# Patient Record
Sex: MALE | Race: BLACK OR AFRICAN AMERICAN | NOT HISPANIC OR LATINO | Employment: FULL TIME | RURAL
[De-identification: names, ages, dates, MRNs, and addresses within clinical notes are randomized per-mention and may not be internally consistent; named-entity substitution may affect disease eponyms.]

---
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
## 2025-05-07 ENCOUNTER — HOSPITAL ENCOUNTER (EMERGENCY)
Facility: HOSPITAL | Age: 20
Discharge: HOME | End: 2025-05-07
Payer: COMMERCIAL

## 2025-05-07 VITALS
DIASTOLIC BLOOD PRESSURE: 73 MMHG | SYSTOLIC BLOOD PRESSURE: 146 MMHG | HEART RATE: 69 BPM | BODY MASS INDEX: 17.3 KG/M2 | RESPIRATION RATE: 18 BRPM | WEIGHT: 114.13 LBS | TEMPERATURE: 99 F | HEIGHT: 68 IN | OXYGEN SATURATION: 100 %
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
## 2025-05-07 DIAGNOSIS — M62.838 MUSCLE SPASMS OF NECK: ICD-10-CM

## 2025-05-07 DIAGNOSIS — S46.211A STRAIN OF RIGHT BICEPS MUSCLE, INITIAL ENCOUNTER: ICD-10-CM

## 2025-05-07 PROCEDURE — 63600175 PHARM REV CODE 636 W HCPCS: Mod: JZ,TB | Performed by: NURSE PRACTITIONER

## 2025-05-07 PROCEDURE — 99284 EMERGENCY DEPT VISIT MOD MDM: CPT | Mod: ,,, | Performed by: NURSE PRACTITIONER

## 2025-05-07 PROCEDURE — 99284 EMERGENCY DEPT VISIT MOD MDM: CPT | Mod: 25

## 2025-05-07 PROCEDURE — 96372 THER/PROPH/DIAG INJ SC/IM: CPT | Performed by: NURSE PRACTITIONER

## 2025-05-07 RX ORDER — NAPROXEN 500 MG/1
500 TABLET ORAL 2 TIMES DAILY WITH MEALS
Qty: 20 TABLET | Refills: 0 | Status: SHIPPED | OUTPATIENT
Start: 2025-05-07

## 2025-05-07 RX ORDER — CYCLOBENZAPRINE HCL 5 MG
5 TABLET ORAL 2 TIMES DAILY PRN
Qty: 10 TABLET | Refills: 0 | Status: SHIPPED | OUTPATIENT
Start: 2025-05-07 | End: 2025-05-12

## 2025-05-07 RX ORDER — KETOROLAC TROMETHAMINE 30 MG/ML
30 INJECTION, SOLUTION INTRAMUSCULAR; INTRAVENOUS
Status: COMPLETED | OUTPATIENT
Start: 2025-05-07 | End: 2025-05-07

## 2025-05-07 RX ORDER — ORPHENADRINE CITRATE 30 MG/ML
60 INJECTION INTRAMUSCULAR; INTRAVENOUS
Status: COMPLETED | OUTPATIENT
Start: 2025-05-07 | End: 2025-05-07

## 2025-05-07 RX ADMIN — KETOROLAC TROMETHAMINE 30 MG: 30 INJECTION, SOLUTION INTRAMUSCULAR at 07:05

## 2025-05-07 RX ADMIN — ORPHENADRINE CITRATE 60 MG: 60 INJECTION INTRAMUSCULAR; INTRAVENOUS at 07:05

## 2025-05-07 NOTE — Clinical Note
"Davarious "Jean-Claude iLndquist was seen and treated in our emergency department on 5/7/2025.  He may return to work on 05/08/2025.  Patient treated in ER on 5/7/2025     If you have any questions or concerns, please don't hesitate to call.      S Bishop RN/ SAMINA Donis NP RN    "

## 2025-05-07 NOTE — Clinical Note
"Quangarious "Werner"Lexa was seen and treated in our emergency department on 5/7/2025.  He may return to work on 05/09/2025.       If you have any questions or concerns, please don't hesitate to call.       RN    "

## 2025-05-07 NOTE — LETTER
"  [facility] Emergency Department  [address] [facility]  [place] [address]  Phone: [phone]  Fax: [phone]   May 14, 2025    Patient: Davsadie "Werner" Lexa   YOB: 2005   Date of Visit: 5/7/2025   Patient ID [id number]       To Whom It May Concern:    Werner "Werner" Lexa was seen and treated in our emergency department on 5/7/2025. He may return to work on 5/9/2025.      Sincerely,          ,       "

## 2025-05-07 NOTE — Clinical Note
"Davarious "Jean-Claude Lindquist was seen and treated in our emergency department on 5/7/2025.  He may return to work on 05/08/2025.       If you have any questions or concerns, please don't hesitate to call.      Ruma Donis, GIORGIP"

## 2025-05-07 NOTE — Clinical Note
"Davarious "Jean-Claude Lindquist was seen and treated in our emergency department on 5/7/2025.  He may return to work on 05/08/2025.  Patient treated in ER on 5/7/2025     If you have any questions or concerns, please don't hesitate to call.      S Bishop RN/ SAMINA Donis NP RN    "
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
## 2025-05-07 NOTE — Clinical Note
"Davarious "Jean-Claude Lindquist was seen and treated in our emergency department on 5/7/2025.  He may return to work on 05/09/2025.       If you have any questions or concerns, please don't hesitate to call.      YARIEL Anton RN/ SAMINA Donis NP RN    "

## 2025-05-07 NOTE — Clinical Note
"Davarious "Jean-Claude Lindquist was seen and treated in our emergency department on 5/7/2025.  He may return to work on 05/09/2025.       If you have any questions or concerns, please don't hesitate to call.      Ruma Donis, GIORGIP"

## 2025-05-07 NOTE — DISCHARGE INSTRUCTIONS
Rest, Ice affected area, stretches as tolerated. Naproxen and Flexeril as needed. Follow up with primary care provider.     The examination and treatment you have received in the Emergency Department today have been rendered on an emergency basis only and are not intended to be a substitute for an effort to provide complete medical care. You should contact your follow-up physician as it is important that you let him or her check you and report any new or remaining problems since it is impossible to recognize and treat all elements of an injury or illness in a single emergency care center visit.